# Patient Record
(demographics unavailable — no encounter records)

---

## 2025-07-30 NOTE — DATA REVIEWED
[Normal] : X-ray revealed no displaced fractures, dislocations or other abnormalities. [de-identified] : 4-5 views of scoliosis study reviewed. S shaped scoliotic curve, major curve being the thoracic one with apex T9-T10 and Risser score of 1.

## 2025-07-30 NOTE — HISTORY OF PRESENT ILLNESS
[FreeTextEntry1] : 11 y/o female here today for evaluation for back pain. central pain of the mid thoracic.  pain started in May

## 2025-07-30 NOTE — ASSESSMENT
[FreeTextEntry1] : At this time, I recommend a scoliosis brace and physical therapy. Discussed that the brace needs to be worn 16 hours daily. Advised that surgery may be a possibility if bracing and PT do not help. Follow up in 3 months.

## 2025-07-30 NOTE — HISTORY OF PRESENT ILLNESS
[FreeTextEntry1] : 13 y/o female here today for evaluation for back pain. central pain of the mid thoracic.  pain started in May

## 2025-07-30 NOTE — DATA REVIEWED
[Normal] : X-ray revealed no displaced fractures, dislocations or other abnormalities. [de-identified] : 4-5 views of scoliosis study reviewed. S shaped scoliotic curve, major curve being the thoracic one with apex T9-T10 and Risser score of 1.